# Patient Record
Sex: MALE | Race: OTHER | NOT HISPANIC OR LATINO | URBAN - METROPOLITAN AREA
[De-identification: names, ages, dates, MRNs, and addresses within clinical notes are randomized per-mention and may not be internally consistent; named-entity substitution may affect disease eponyms.]

---

## 2018-08-06 ENCOUNTER — OUTPATIENT (OUTPATIENT)
Dept: OUTPATIENT SERVICES | Facility: HOSPITAL | Age: 23
LOS: 1 days | Discharge: HOME | End: 2018-08-06

## 2018-08-06 DIAGNOSIS — Z00.00 ENCOUNTER FOR GENERAL ADULT MEDICAL EXAMINATION WITHOUT ABNORMAL FINDINGS: ICD-10-CM

## 2018-12-05 PROBLEM — Z00.00 ENCOUNTER FOR PREVENTIVE HEALTH EXAMINATION: Status: ACTIVE | Noted: 2018-12-05

## 2019-01-04 ENCOUNTER — APPOINTMENT (OUTPATIENT)
Dept: UROLOGY | Facility: CLINIC | Age: 24
End: 2019-01-04
Payer: COMMERCIAL

## 2019-01-04 VITALS
HEART RATE: 72 BPM | BODY MASS INDEX: 22.36 KG/M2 | SYSTOLIC BLOOD PRESSURE: 124 MMHG | WEIGHT: 151 LBS | HEIGHT: 69 IN | DIASTOLIC BLOOD PRESSURE: 74 MMHG

## 2019-01-04 DIAGNOSIS — Z78.9 OTHER SPECIFIED HEALTH STATUS: ICD-10-CM

## 2019-01-04 DIAGNOSIS — N50.0 ATROPHY OF TESTIS: ICD-10-CM

## 2019-01-04 DIAGNOSIS — S39.94XA UNSPECIFIED INJURY OF EXTERNAL GENITALS, INITIAL ENCOUNTER: ICD-10-CM

## 2019-01-04 PROCEDURE — 99203 OFFICE O/P NEW LOW 30 MIN: CPT

## 2019-01-18 ENCOUNTER — APPOINTMENT (OUTPATIENT)
Dept: UROLOGY | Facility: CLINIC | Age: 24
End: 2019-01-18

## 2022-12-14 ENCOUNTER — EMERGENCY (EMERGENCY)
Facility: HOSPITAL | Age: 27
LOS: 0 days | Discharge: HOME | End: 2022-12-14
Attending: EMERGENCY MEDICINE | Admitting: EMERGENCY MEDICINE

## 2022-12-14 VITALS
SYSTOLIC BLOOD PRESSURE: 117 MMHG | HEIGHT: 69 IN | TEMPERATURE: 97 F | OXYGEN SATURATION: 100 % | DIASTOLIC BLOOD PRESSURE: 70 MMHG | HEART RATE: 57 BPM | WEIGHT: 145.06 LBS | RESPIRATION RATE: 18 BRPM

## 2022-12-14 VITALS — WEIGHT: 139.99 LBS

## 2022-12-14 DIAGNOSIS — R10.33 PERIUMBILICAL PAIN: ICD-10-CM

## 2022-12-14 DIAGNOSIS — R10.13 EPIGASTRIC PAIN: ICD-10-CM

## 2022-12-14 PROCEDURE — 99284 EMERGENCY DEPT VISIT MOD MDM: CPT

## 2022-12-14 NOTE — ED PROVIDER NOTE - PATIENT PORTAL LINK FT
You can access the FollowMyHealth Patient Portal offered by [facility] by registering at the following website: [email or web address]. By joining [facility]’s FollowMyHealth portal, you will also be able to view your health information using other applications (apps) compatible with our system.

## 2022-12-14 NOTE — ED PROVIDER NOTE - PROVIDER TOKENS
FREE:[LAST:[follow up with your PMD or return to ED for any concerns],PHONE:[(   )    -],FAX:[(   )    -]]

## 2022-12-14 NOTE — ED PROVIDER NOTE - OBJECTIVE STATEMENT
Pt with no PMH presents with abd pain that has now resolved. Last night 11pm had brief episode of sharp periumbilical pain that went away after a few mins. No other symptoms. Went to bed and woke at 6am. Had to have a BM. Pt finished his Bm then had that same sharp periumbilical pain that persisted 30 mins. Had a second BM during that same time period and pain persisted. BMs were ami. No NVD, fever, chills, back pain, testicular pain, urinary symptoms. Pt called EMS and was transported to ED. During transit the pain now totally resolved. Pt currently asymptomatic. Denies sick contacts. No abd surgical hx

## 2022-12-14 NOTE — ED PROVIDER NOTE - NS ED ROS FT
CONST: No fever, chills or bodyaches  EYES: No pain, redness, drainage or visual changes.  ENT: No ear pain or discharge, nasal discharge or congestion. No sore throat  CARD: No chest pain, palpitations  RESP: No SOB, cough, hemoptysis. No hx of asthma or COPD  GI: (+)abdominal pain (resolved), no N/V/D  MS: No joint pain, back pain or extremity pain/injury  SKIN: No rashes  NEURO: No headache, dizziness, paresthesias or LOC  : no dysuria or testicular pain

## 2022-12-14 NOTE — ED ADULT NURSE NOTE - SUICIDE SCREENING DEPRESSION
Constitutional:  No fevers or chills.  Eyes:  No visual changes, eye pain, or discharge.  ENT:  No hearing changes. No sore throat.  Neck:  No neck pain or stiffness.  Cardiac:  No CP or edema.  Resp:  No cough or SOB. No hemoptysis.   GI:  No nausea, vomiting, diarrhea, (+) abdominal pain.  :  No dysuria, frequency, or hematuria.  MSK:  No myalgias or joint pain/swelling.  Neuro:  No headache, dizziness, or weakness.  Skin:  No skin rash. Negative

## 2022-12-14 NOTE — ED PROVIDER NOTE - CLINICAL SUMMARY MEDICAL DECISION MAKING FREE TEXT BOX
28 yo M presented to ED for abdominal pain. pt abdomen sntnd and pt is not currently in pain. no acute intervention at this time. dc home with strict return precautions

## 2022-12-14 NOTE — ED ADULT NURSE NOTE - OBJECTIVE STATEMENT
Patient reports 1/10 abdominal pain that has been intermittent since yesterday.  Pain is periumbilical.  No nausea, vomiting, or fevers.  Abdomen is soft, non-tender, and not distended.  No painful urination.

## 2022-12-14 NOTE — ED PROVIDER NOTE - NS ED ATTENDING STATEMENT MOD
This was a shared visit with the TAMIKA. I reviewed and verified the documentation and independently performed the documented:

## 2022-12-14 NOTE — ED PROVIDER NOTE - ATTENDING APP SHARED VISIT CONTRIBUTION OF CARE
27-year-old male no past medical history presents to ED for epigastric abdominal pain. Patient states he had symptoms around 11 PM and went away and then began at 6 AM. Patient had bowel movement. Upon arrival patient states his abdomen is completely gone. No nausea vomiting diarrhea constipation fever chills. No history abdominal surgeries.    Const: NAD  Eyes: PERRL, no conjunctival injection  HENT:  Neck supple without meningismus   CV: RRR, Warm, well-perfused extremities  RESP: CTA B/L, no tachypnea   GI: soft, non-tender, non-distended, umbilical skin from previous hernia   MSK: No gross deformities appreciated  Skin: Warm, dry. No rashes  Neuro: Alert, CNs II-XII grossly intact. Sensation and motor function of extremities grossly intact.  Psych: Appropriate mood and affect.

## 2022-12-14 NOTE — ED PROVIDER NOTE - CARE PROVIDER_API CALL
follow up with your PMD or return to ED for any concerns,   Phone: (   )    -  Fax: (   )    -  Follow Up Time:

## 2022-12-14 NOTE — ED PROVIDER NOTE - PHYSICAL EXAMINATION
CONST: Well appearing in NAD  EYES: PERRL, EOMI, Sclera and conjunctiva clear. s  CARD: Normal S1 S2; Normal rate and rhythm  RESP: Equal BS B/L, No wheezes, rhonchi or rales. No distress  GI: Soft, non-tender, non-distended. No rebound or guarding. No masses or hernias. No RLQT. No RUQT. No CVAT  MS: Normal ROM in all extremities. No midline spinal tenderness.  SKIN: Warm, dry, no acute rashes. Good turgor  NEURO: A&Ox3, No focal deficits. Strength 5/5 with no sensory deficits. Steady gait

## 2024-05-14 NOTE — ED ADULT NURSE NOTE - EXTENSIONS OF SELF_ADULT
Subjective   Patient ID: Lucius Dueñas times but the blood is only on the tissue.  No c/o any anal pain.  He does not take any fiber suppolements or Colace.  No c/o any weigth loss.  No c/o any abdominal pain.  No c/o any n/v.    No family hx of colorecdtal cancer.      Review of Systems   All other systems reviewed and are negative.      Objective   Physical Exam  Constitutional:       Appearance: Normal appearance.   HENT:      Head: Normocephalic.   Cardiovascular:      Rate and Rhythm: Normal rate and regular rhythm.   Pulmonary:      Effort: Pulmonary effort is normal.      Breath sounds: Normal breath sounds.   Abdominal:      General: Abdomen is flat. Bowel sounds are normal.      Palpations: Abdomen is soft.      Hernia: A hernia (He has a small umbilical hernia with discomfort) is present.   Musculoskeletal:         General: Normal range of motion.      Cervical back: Normal range of motion and neck supple.   Skin:     General: Skin is warm and dry.   Neurological:      General: No focal deficit present.      Mental Status: He is alert and oriented to person, place, and time.   Psychiatric:         Mood and Affect: Mood normal.         Behavior: Behavior normal.         Assessment/Plan   Lucius will schedule to have a colonoscopy in the near future.  We talked about the repair of the umbilical hernia and would like to have that done.  He will schedule to see Dr. Tariq in the near future to have that done.  He will call with any issues and will follow up on an as needed basis.       
139.9
None